# Patient Record
Sex: MALE | Race: BLACK OR AFRICAN AMERICAN
[De-identification: names, ages, dates, MRNs, and addresses within clinical notes are randomized per-mention and may not be internally consistent; named-entity substitution may affect disease eponyms.]

---

## 2017-04-30 ENCOUNTER — HOSPITAL ENCOUNTER (EMERGENCY)
Dept: HOSPITAL 8 - ED | Age: 27
Discharge: HOME | End: 2017-04-30
Payer: MEDICAID

## 2017-04-30 VITALS — HEIGHT: 78 IN | WEIGHT: 315 LBS | BODY MASS INDEX: 36.45 KG/M2

## 2017-04-30 VITALS — DIASTOLIC BLOOD PRESSURE: 90 MMHG | SYSTOLIC BLOOD PRESSURE: 151 MMHG

## 2017-04-30 DIAGNOSIS — Z87.891: ICD-10-CM

## 2017-04-30 DIAGNOSIS — J01.00: ICD-10-CM

## 2017-04-30 DIAGNOSIS — B97.89: ICD-10-CM

## 2017-04-30 DIAGNOSIS — H66.003: ICD-10-CM

## 2017-04-30 DIAGNOSIS — J02.8: Primary | ICD-10-CM

## 2017-04-30 PROCEDURE — 99283 EMERGENCY DEPT VISIT LOW MDM: CPT

## 2017-06-16 ENCOUNTER — HOSPITAL ENCOUNTER (EMERGENCY)
Dept: HOSPITAL 8 - ED | Age: 27
Discharge: HOME | End: 2017-06-16
Payer: MEDICAID

## 2017-06-16 VITALS — SYSTOLIC BLOOD PRESSURE: 115 MMHG | DIASTOLIC BLOOD PRESSURE: 80 MMHG

## 2017-06-16 VITALS — WEIGHT: 297.62 LBS | HEIGHT: 78 IN | BODY MASS INDEX: 34.44 KG/M2

## 2017-06-16 DIAGNOSIS — J02.0: Primary | ICD-10-CM

## 2017-06-16 PROCEDURE — 99284 EMERGENCY DEPT VISIT MOD MDM: CPT

## 2017-06-16 PROCEDURE — 96372 THER/PROPH/DIAG INJ SC/IM: CPT

## 2017-06-16 PROCEDURE — 87081 CULTURE SCREEN ONLY: CPT

## 2017-06-16 PROCEDURE — 87880 STREP A ASSAY W/OPTIC: CPT

## 2017-06-19 ENCOUNTER — HOSPITAL ENCOUNTER (EMERGENCY)
Dept: HOSPITAL 8 - ED | Age: 27
Discharge: HOME | End: 2017-06-19
Payer: MEDICAID

## 2017-06-19 VITALS — SYSTOLIC BLOOD PRESSURE: 119 MMHG | DIASTOLIC BLOOD PRESSURE: 79 MMHG

## 2017-06-19 VITALS — WEIGHT: 315 LBS | BODY MASS INDEX: 36.45 KG/M2 | HEIGHT: 78 IN
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
## 2017-06-19 DIAGNOSIS — H92.02: ICD-10-CM

## 2017-06-19 DIAGNOSIS — S00.512A: ICD-10-CM

## 2017-06-19 DIAGNOSIS — J02.8: Primary | ICD-10-CM
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
## 2017-06-19 PROCEDURE — 99281 EMR DPT VST MAYX REQ PHY/QHP: CPT

## 2017-08-08 ENCOUNTER — HOSPITAL ENCOUNTER (EMERGENCY)
Dept: HOSPITAL 8 - ED | Age: 27
Discharge: HOME | End: 2017-08-08
Payer: MEDICAID

## 2017-08-08 VITALS — DIASTOLIC BLOOD PRESSURE: 66 MMHG | SYSTOLIC BLOOD PRESSURE: 113 MMHG

## 2017-08-08 VITALS — WEIGHT: 310.65 LBS | HEIGHT: 78 IN | BODY MASS INDEX: 35.94 KG/M2
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
## 2017-08-08 DIAGNOSIS — S29.012A: Primary | ICD-10-CM

## 2017-08-08 PROCEDURE — 99283 EMERGENCY DEPT VISIT LOW MDM: CPT

## 2017-08-23 ENCOUNTER — HOSPITAL ENCOUNTER (EMERGENCY)
Dept: HOSPITAL 8 - ED | Age: 27
Discharge: HOME | End: 2017-08-23
Payer: MEDICAID

## 2017-08-23 VITALS — WEIGHT: 195.33 LBS | HEIGHT: 78 IN | BODY MASS INDEX: 22.6 KG/M2

## 2017-08-23 VITALS — DIASTOLIC BLOOD PRESSURE: 92 MMHG | SYSTOLIC BLOOD PRESSURE: 138 MMHG

## 2017-08-23 DIAGNOSIS — R07.89: Primary | ICD-10-CM

## 2017-08-23 PROCEDURE — 99284 EMERGENCY DEPT VISIT MOD MDM: CPT

## 2017-08-23 PROCEDURE — 96372 THER/PROPH/DIAG INJ SC/IM: CPT

## 2017-08-23 PROCEDURE — 71020: CPT

## 2017-08-23 PROCEDURE — 93005 ELECTROCARDIOGRAM TRACING: CPT

## 2020-03-15 ENCOUNTER — HOSPITAL ENCOUNTER (EMERGENCY)
Dept: HOSPITAL 8 - ED | Age: 30
Discharge: HOME | End: 2020-03-15
Payer: SELF-PAY

## 2020-03-15 VITALS — WEIGHT: 315 LBS | HEIGHT: 78 IN | BODY MASS INDEX: 36.45 KG/M2

## 2020-03-15 VITALS — SYSTOLIC BLOOD PRESSURE: 145 MMHG | DIASTOLIC BLOOD PRESSURE: 98 MMHG

## 2020-03-15 DIAGNOSIS — R07.89: ICD-10-CM

## 2020-03-15 DIAGNOSIS — R05: Primary | ICD-10-CM

## 2020-03-15 PROCEDURE — 99285 EMERGENCY DEPT VISIT HI MDM: CPT

## 2020-03-15 PROCEDURE — 71046 X-RAY EXAM CHEST 2 VIEWS: CPT

## 2020-03-15 PROCEDURE — 93005 ELECTROCARDIOGRAM TRACING: CPT

## 2020-03-15 PROCEDURE — 87400 INFLUENZA A/B EACH AG IA: CPT

## 2020-03-15 NOTE — NUR
PT TO ED WITH C/O COUGHING AND CP. PT REPORTS CP X3 DAYS, STATES "I WASNT 
WORRIED ABOUT IT, BUT MY DAUGHTER HAS LUPUS SO I BETTER GET CHECKED". PT DENIES 
FEVER, BODY ACHES, CHILLS, SOB OR ANY OTHER C/O AT THIS TIME. PT CONNECTED TO 
MONITORING, CALL LIGHT WITHIN REACH.

## 2020-03-18 ENCOUNTER — HOSPITAL ENCOUNTER (EMERGENCY)
Dept: HOSPITAL 8 - ED | Age: 30
LOS: 1 days | Discharge: HOME | End: 2020-03-19
Payer: SELF-PAY

## 2020-03-18 VITALS — BODY MASS INDEX: 36.45 KG/M2 | WEIGHT: 315 LBS | HEIGHT: 78 IN

## 2020-03-18 DIAGNOSIS — H66.001: Primary | ICD-10-CM

## 2020-03-18 PROCEDURE — 99283 EMERGENCY DEPT VISIT LOW MDM: CPT

## 2020-03-19 VITALS — DIASTOLIC BLOOD PRESSURE: 88 MMHG | SYSTOLIC BLOOD PRESSURE: 141 MMHG

## 2020-03-19 NOTE — NUR
PT TO ED WITH RIGHT EAR PAIN STARTING 3 DAYS AGO. ALSO REPORTS SOME DENTAL PAIN 
ON RIGHT SIDE BOTH UPPER AND LOWER, REPORTS HAVING A BROKEN TOOTH. PT DENIES 
ANY OTHER C/O AT THIS TIME. CALL LIGHT WITHIN REACH. MONITORING APPLIED. FAMILY 
AT  FOR SUPPORT.

## 2020-09-23 ENCOUNTER — HOSPITAL ENCOUNTER (EMERGENCY)
Dept: HOSPITAL 8 - ED | Age: 30
End: 2020-09-23
Payer: COMMERCIAL

## 2020-09-23 VITALS — WEIGHT: 315 LBS | BODY MASS INDEX: 36.45 KG/M2 | HEIGHT: 78 IN

## 2020-09-23 VITALS — SYSTOLIC BLOOD PRESSURE: 135 MMHG | DIASTOLIC BLOOD PRESSURE: 94 MMHG

## 2020-09-23 DIAGNOSIS — K12.0: Primary | ICD-10-CM

## 2020-09-23 DIAGNOSIS — H92.01: ICD-10-CM

## 2020-09-23 PROCEDURE — 99281 EMR DPT VST MAYX REQ PHY/QHP: CPT
